# Patient Record
Sex: MALE | Race: AMERICAN INDIAN OR ALASKA NATIVE
[De-identification: names, ages, dates, MRNs, and addresses within clinical notes are randomized per-mention and may not be internally consistent; named-entity substitution may affect disease eponyms.]

---

## 2019-10-06 ENCOUNTER — HOSPITAL ENCOUNTER (EMERGENCY)
Dept: HOSPITAL 76 - ED | Age: 84
Discharge: HOME | End: 2019-10-06
Payer: MEDICARE

## 2019-10-06 VITALS — SYSTOLIC BLOOD PRESSURE: 140 MMHG | DIASTOLIC BLOOD PRESSURE: 86 MMHG

## 2019-10-06 DIAGNOSIS — K40.90: Primary | ICD-10-CM

## 2019-10-06 DIAGNOSIS — F03.90: ICD-10-CM

## 2019-10-06 PROCEDURE — 99281 EMR DPT VST MAYX REQ PHY/QHP: CPT

## 2019-10-06 PROCEDURE — 99284 EMERGENCY DEPT VISIT MOD MDM: CPT

## 2019-10-06 NOTE — ED PHYSICIAN DOCUMENTATION
History of Present Illness





- Stated complaint


Stated Complaint: GROIN PX





- Chief complaint


Chief Complaint: General





- History obtained from


History obtained from: Patient, Family





- History of Present Illness


Timing: Today


Pain level max: 9


Pain level now: 0





- Additonal information


Additional information: 





Patient has a right inguinal hernia.  He states he is scheduled for surgery 

tomorrow morning.  States it became painful earlier today.  Not painful now.  No

vomiting.  No fevers.  Nothing makes it better or worse.





Review of Systems


Constitutional: denies: Fever, Chills


Cardiac: denies: Chest pain / pressure


Respiratory: denies: Cough


GI: denies: Vomiting, Diarrhea


Skin: denies: Rash


Musculoskeletal: denies: Neck pain, Back pain





PD PAST MEDICAL HISTORY





- Past Medical History


Past Medical History: Yes


Neuro: Dementia





- Present Medications


Home Medications: 


                                Ambulatory Orders











 Medication  Instructions  Recorded  Confirmed


 


No Known Home Medications  10/06/19 10/06/19














- Allergies


Allergies/Adverse Reactions: 


                                    Allergies











Allergy/AdvReac Type Severity Reaction Status Date / Time


 


No Known Drug Allergies Allergy   Verified 10/06/19 17:08














- Social History


Does the pt smoke?: No


Smoking Status: Never smoker





PD ED PE NORMAL





- Vitals


Vital signs reviewed: Yes





- General


General: Alert and oriented X 3, No acute distress





- HEENT


HEENT: Moist mucous membranes





- Neck


Neck: Supple, no meningeal sign





- Cardiac


Cardiac: RRR





- Respiratory


Respiratory: No respiratory distress, Clear bilaterally





- Abdomen


Abdomen: Soft, Non tender, Non distended





- Male 


Male : Other (Small right inguinal hernia.  No tenderness palpation.  No 

swelling.  No skin changes.)





- Derm


Derm: Warm and dry





- Neuro


Neuro: Alert and oriented X 3





Results





- Vitals


Vitals: 


                               Vital Signs - 24 hr











  10/06/19





  17:06


 


Temperature 36.6 C


 


Heart Rate 83


 


Respiratory 18





Rate 


 


Blood Pressure 140/86 H


 


O2 Saturation 100








                                     Oxygen











O2 Source                      Room air

















PD MEDICAL DECISION MAKING





- ED course


Complexity details: considered differential, d/w patient


ED course: 





Patient with a small right inguinal hernia.  No evidence of incarceration.  No 

evidence of bowel obstruction.  No evidence of bowel infarct.  Abdomen is soft, 

nontender nondistended.  He is scheduled for surgery tomorrow.  We will have him

follow-up with his surgeon tomorrow.  Patient and family counseled regarding 

signs and symptoms for which I believe and urgent re-evaluation would be 

necessary. Patient with good understanding of and agreement to plan and is 

comfortable going home at this time





This document was made in part using voice recognition software. While efforts 

are made to proofread this document, sound alike and grammatical errors may 

occur.





Departure





- Departure


Disposition: 01 Home, Self Care


Clinical Impression: 


 Right inguinal hernia





Condition: Good


Instructions:  ED Hernia Inguinal


Follow-Up: 


Cheng Aguilar MD [Provider Admit Priv/Credential] - Tomorrow


Comments: 


Follow-up with Dr. Aguilar in the morning for your surgery as scheduled.  Return 

if you worsen.


Discharge Date/Time: 10/06/19 17:34

## 2019-10-07 ENCOUNTER — HOSPITAL ENCOUNTER (OUTPATIENT)
Dept: HOSPITAL 76 - SDS | Age: 84
Discharge: HOME | End: 2019-10-07
Attending: SURGERY
Payer: MEDICARE

## 2019-10-07 VITALS — DIASTOLIC BLOOD PRESSURE: 74 MMHG | SYSTOLIC BLOOD PRESSURE: 113 MMHG

## 2019-10-07 DIAGNOSIS — F03.90: ICD-10-CM

## 2019-10-07 DIAGNOSIS — Z87.891: ICD-10-CM

## 2019-10-07 DIAGNOSIS — Z72.89: ICD-10-CM

## 2019-10-07 DIAGNOSIS — K40.90: Primary | ICD-10-CM

## 2019-10-07 PROCEDURE — 49505 PRP I/HERN INIT REDUC >5 YR: CPT

## 2019-10-07 PROCEDURE — 0VBF0ZZ EXCISION OF RIGHT SPERMATIC CORD, OPEN APPROACH: ICD-10-PCS | Performed by: SURGERY

## 2019-10-07 PROCEDURE — 0YU50JZ SUPPLEMENT RIGHT INGUINAL REGION WITH SYNTHETIC SUBSTITUTE, OPEN APPROACH: ICD-10-PCS | Performed by: SURGERY

## 2019-10-07 NOTE — ANESTHESIA
Pre-Anesthesia VS, & Labs





- Diagnosis





Right Inguinal hernia





- Procedure





right inguinal hernia repair with mesh


Vital Signs: 





                                        











Temp Pulse Resp BP Pulse Ox


 


 36.7 C   79   18   136/82 H  98 


 


 10/07/19 07:21  10/07/19 07:21  10/07/19 07:21  10/07/19 07:21  10/07/19 07:21














                                        





Height                           5 ft 10 in


Weight (kg)                      75.3 kg


Body Mass Index                  23.6











- NPO


>8 hours





Home Medications and Allergies


Home Medications: 


Ambulatory Orders





Turmeric 400 mg PO DAILY 10/07/19 











                                        





Turmeric 400 mg PO DAILY 10/07/19 





CBD oil


Allergies/Adverse Reactions: 


                                    Allergies











Allergy/AdvReac Type Severity Reaction Status Date / Time


 


No Known Drug Allergies Allergy   Verified 10/07/19 07:34














Anes History & Medical History





- Anesthetic History


Anesthesia Complications: reports: No previous complications





- Medical History


Cardiovascular: reports: None


Pulmonary: reports: None


Gastrointestinal: reports: None


Urinary: reports: None


Neuro: reports: Dementia


Musculoskeletal: reports: None


Endocrine/Autoimmune: reports: None


Skin: reports: None


Smoking Status: Former smoker (Quit in 1968. 15 pack year history)


Psychosocial: reports: Cannabis (CBC oil daily)





- Surgical History


Eyes Ears Nose Throat (EENT): Tonsil/Adenoidectomy





Exam


General: Alert, Cooperative, No acute distress


Dental: Poor dentition


Mouth Opening: 3 Fingerbreadth


Neck Mobility: Normal


Mallampati classification: I


Thyromental Distance: greater than 6 cm


Respiratory: Lungs clear, Normal breath sounds, No respiratory distress, No 

accessory muscle use


Cardiovascular: Regular rate, Normal S1, Normal S2, No murmurs


Cognitive Status: Dementia





Plan


Anesthesia Type: MAC


Consent for Procedure(s) Verified and Reviewed: Yes


Code Status: Attempt Resuscitation


ASA classification: 3-Severe systemic disease


Is this case an emergency?: No

## 2019-10-07 NOTE — OPERATIVE REPORT
DATE OF SERVICE: 10/07/2019

Physician: Cheng Aguilar MD

 

PREOPERATIVE DIAGNOSIS:  Symptomatic right inguinal hernia.

 

POSTOPERATIVE DIAGNOSES

1.  Symptomatic right inguinal hernia.

2.  Cord lipoma.

 

PROCEDURE PERFORMED

1.  Open repair of right inguinal hernia with polypropylene mesh.

2.  Resection of cord lipoma.

 

ANESTHESIA:  Local plus monitored anesthesia care by Edgar Mckeon CRNA.

 

SURGEON:  Cheng Aguilar MD

 

ESTIMATED BLOOD LOSS:  5 mL

 

COMPLICATIONS:  None.

 

DRAINS:  None.

 

FINDINGS:  A small indirect right inguinal hernia was identified along with a large, 5 cm cord lipoma
.  There was no evidence of indirect or femoral hernia.

 

INDICATIONS:  Patient is an 86-year-old gentleman with recent onset of an exquisitely painful right g
roin bulge.  Examination revealed a small reducible right inguinal hernia.  He was advised to undergo
 repair.

 

TECHNIQUE:  After informed consent, patient was taken to the operating room where he was sedated and 
monitored.  He was placed supine on the procedure table.  His right groin had been clipped in the ASU
, was prepared with ChloraPrep solution, following which a right groin block was instituted using a 5
0:50 combination of 1% lidocaine plain and 0.5% Marcaine with epinephrine.  A total of 30 mL of a mix
ture was used.  The right groin was re-prepared with ChloraPrep solution and draped in the usual ster
ile fashion.  Preoperative preparation included application of sequential calf compression boots and 
administration of 2 grams cefazolin intravenously within an hour of the incision.  An incision was ma
de in the skin lines of the right groin beginning above the pubic tubercle and extending laterally 5 
cm.  Hemostasis achieved with electrocautery and 2-0 Vicryl ties.  Incision carried down through the 
layers of abdominal wall until the external oblique aponeurosis was exposed and identified and incise
d along the lines of its fibers in such a manner as to open the external ring and expose the internal
 ring.  The spermatic cord was mobilized and encircled with a Penrose drain.  The ilioinguinal nerve 
was divided to avoid injury and entrapment syndrome.  The cord was carefully dissected, isolating a l
arge cord lipoma, which was dissected free from surrounding cord structures to the level of the inter
nal ring, where it was ligated with 2-0 Vicryl, amputated and discarded.  Continued dissection of the
 cord revealed the indirect sac, which was similarly dissected free from surrounding cord structure a
t the level of the internal ring.  The sac was opened and a finger inserted in the peritoneal cavity.
  Search for direct and femoral hernias made and none was identified.  The hernia sac was then twiste
d and doubly highly ligated with 3-0 silk suture ligatures, following which excess hernia sac was amp
utated and discarded.  After hemostasis was assured, the wound was irrigated with antibiotic solution
 containing 1 gram cefazolin per liter following which, a Bard mesh medium weight polypropylene precu
t slotted inguinal patch was brought onto the field, soaked in the antibiotic solution and then place
d over the inguinal floor where it was secured in place circumferentially with continuous 3-0 Prolene
 sutures, securing the edge of the mesh to the shelving edge of Poupart's ligament inferiorly, to the
 internal oblique aponeurosis superolaterally and to the lateral border of the rectus sheath medially
.  Care was taken to avoid excessive tightening of the mesh around the cord at the left internal ring
.  Once again, after hemostasis was assured, the wound was irrigated with antibiotic solution, follow
ing which wound closure was accomplished in layers using continuous 2-0 Vicryl to reapproximate the e
xternal oblique aponeurosis overlying the cord, followed by 3-0 Vicryl for Ray's fascia, followed 
by 4-0 Monocryl for a subcuticular skin closure, followed by Dermabond.  The procedure was terminated
 and patient transferred out of the operating room in satisfactory condition.  Sponge and needle coun
ts were correct x2 and no drains were used.

 

 

cc: HOLGER Levi PA-C

DD: 10/07/2019 10:33

TD: 10/07/2019 10:37

Job #: [id number]